# Patient Record
Sex: MALE | Race: WHITE | URBAN - METROPOLITAN AREA
[De-identification: names, ages, dates, MRNs, and addresses within clinical notes are randomized per-mention and may not be internally consistent; named-entity substitution may affect disease eponyms.]

---

## 2019-09-25 ENCOUNTER — OFFICE VISIT (OUTPATIENT)
Dept: FAMILY MEDICINE CLINIC | Age: 49
End: 2019-09-25
Payer: COMMERCIAL

## 2019-09-25 VITALS
SYSTOLIC BLOOD PRESSURE: 107 MMHG | BODY MASS INDEX: 39.4 KG/M2 | WEIGHT: 266 LBS | OXYGEN SATURATION: 95 % | HEART RATE: 61 BPM | DIASTOLIC BLOOD PRESSURE: 71 MMHG | HEIGHT: 69 IN | TEMPERATURE: 98.7 F

## 2019-09-25 DIAGNOSIS — J06.9 UPPER RESPIRATORY TRACT INFECTION, UNSPECIFIED TYPE: Primary | ICD-10-CM

## 2019-09-25 PROCEDURE — 99203 OFFICE O/P NEW LOW 30 MIN: CPT | Performed by: FAMILY MEDICINE

## 2019-09-25 RX ORDER — ATORVASTATIN CALCIUM 10 MG/1
10 TABLET, FILM COATED ORAL DAILY
COMMUNITY

## 2019-09-25 RX ORDER — ZINC GLUCONATE 50 MG
TABLET ORAL
COMMUNITY
Start: 2019-07-24

## 2019-09-25 RX ORDER — AZITHROMYCIN 250 MG/1
TABLET, FILM COATED ORAL
Qty: 6 TABLET | Refills: 0 | Status: SHIPPED | OUTPATIENT
Start: 2019-09-25 | End: 2019-10-05

## 2019-09-25 RX ORDER — LORATADINE 10 MG/1
10 TABLET ORAL DAILY
COMMUNITY

## 2019-09-25 RX ORDER — BEE POLLEN 580 MG
CAPSULE ORAL
COMMUNITY
Start: 2019-07-24

## 2019-09-25 RX ORDER — IPRATROPIUM BROMIDE 21 UG/1
2 SPRAY, METERED NASAL 3 TIMES DAILY
Qty: 1 BOTTLE | Refills: 3 | Status: SHIPPED | OUTPATIENT
Start: 2019-09-25 | End: 2020-09-24

## 2019-09-25 ASSESSMENT — ENCOUNTER SYMPTOMS
VOMITING: 0
ABDOMINAL DISTENTION: 0
DIARRHEA: 0
COUGH: 1
NAUSEA: 0
SHORTNESS OF BREATH: 0
WHEEZING: 0
RHINORRHEA: 1
SORE THROAT: 1

## 2019-09-25 ASSESSMENT — PATIENT HEALTH QUESTIONNAIRE - PHQ9: DEPRESSION UNABLE TO ASSESS: PT REFUSES

## 2019-09-25 NOTE — PATIENT INSTRUCTIONS
acetaminophen (Tylenol) can be harmful. · Get plenty of rest.  · Do not smoke or allow others to smoke around you. If you need help quitting, talk to your doctor about stop-smoking programs and medicines. These can increase your chances of quitting for good. When should you call for help? Call 911 anytime you think you may need emergency care. For example, call if:    · You have severe trouble breathing.    Call your doctor now or seek immediate medical care if:    · You seem to be getting much sicker.     · You have new or worse trouble breathing.     · You have a new or higher fever.     · You have a new rash.    Watch closely for changes in your health, and be sure to contact your doctor if:    · You have a new symptom, such as a sore throat, an earache, or sinus pain.     · You cough more deeply or more often, especially if you notice more mucus or a change in the color of your mucus.     · You do not get better as expected. Where can you learn more? Go to https://[employer]. org and sign in to your [employer] account. Enter R803 in the [employer] box to learn more about \"Upper Respiratory Infection (Cold): Care Instructions. \"     If you do not have an account, please click on the \"Sign Up Now\" link. Current as of: September 5, 2018  Content Version: 12.1  © 1388-0014 [employer]. Care instructions adapted under license by [facility] ([facility]). If you have questions about a medical condition or this instruction, always ask your healthcare professional. [place] [address] any warranty or liability for your use of this information.        1.) Take atrovent and claritin D as prescribed  2.) If no improvement in symptoms over next couple day, take zpak  3.) Return to clinic if symptoms worsen or do not improve